# Patient Record
Sex: MALE | Race: WHITE | NOT HISPANIC OR LATINO | Employment: FULL TIME | URBAN - METROPOLITAN AREA
[De-identification: names, ages, dates, MRNs, and addresses within clinical notes are randomized per-mention and may not be internally consistent; named-entity substitution may affect disease eponyms.]

---

## 2018-05-21 ENCOUNTER — OFFICE VISIT (OUTPATIENT)
Dept: URGENT CARE | Age: 58
End: 2018-05-21
Payer: COMMERCIAL

## 2018-05-21 VITALS
OXYGEN SATURATION: 99 % | RESPIRATION RATE: 14 BRPM | TEMPERATURE: 98 F | SYSTOLIC BLOOD PRESSURE: 136 MMHG | DIASTOLIC BLOOD PRESSURE: 80 MMHG | HEART RATE: 89 BPM | BODY MASS INDEX: 24.29 KG/M2 | WEIGHT: 145.8 LBS | HEIGHT: 65 IN

## 2018-05-21 DIAGNOSIS — J02.9 SORE THROAT: ICD-10-CM

## 2018-05-21 DIAGNOSIS — J30.1 SEASONAL ALLERGIC RHINITIS DUE TO POLLEN: Primary | ICD-10-CM

## 2018-05-21 PROBLEM — E11.9 TYPE 2 DIABETES MELLITUS (HCC): Status: ACTIVE | Noted: 2018-05-21

## 2018-05-21 LAB — S PYO AG THROAT QL: NEGATIVE

## 2018-05-21 PROCEDURE — 87070 CULTURE OTHR SPECIMN AEROBIC: CPT | Performed by: PHYSICIAN ASSISTANT

## 2018-05-21 PROCEDURE — S9088 SERVICES PROVIDED IN URGENT: HCPCS | Performed by: FAMILY MEDICINE

## 2018-05-21 PROCEDURE — 87430 STREP A AG IA: CPT | Performed by: FAMILY MEDICINE

## 2018-05-21 PROCEDURE — 99213 OFFICE O/P EST LOW 20 MIN: CPT | Performed by: FAMILY MEDICINE

## 2018-05-21 RX ORDER — OMEPRAZOLE 10 MG/1
10 CAPSULE, DELAYED RELEASE ORAL
COMMUNITY

## 2018-05-21 RX ORDER — LISINOPRIL 10 MG/1
10 TABLET ORAL
COMMUNITY

## 2018-05-21 RX ORDER — GLIPIZIDE 10 MG/1
10 TABLET, FILM COATED, EXTENDED RELEASE ORAL
COMMUNITY

## 2018-05-21 NOTE — PATIENT INSTRUCTIONS
Begin zyrtec at bedtime  Flonase or other nasal steroid during the day  Avoid the following: drying your clothes outside, touching your face after being outside, keeping windows open at home  Recheck with your PCP in 5-7 days if no improvement in symptoms or if new symptoms develop  Allergic Rhinitis   WHAT YOU NEED TO KNOW:   Allergic rhinitis, or hay fever, is swelling of the inside of your nose  The swelling is a reaction to allergens in the air  An allergen can be anything that causes an allergic reaction  Allergies to weeds, grass, trees, or mold often cause seasonal allergic rhinitis  Indoor dust mites, cockroaches, pet dander, or mold can also cause allergic rhinitis  DISCHARGE INSTRUCTIONS:   Call 911 for the following:   · You have chest pain or shortness of breath  Return to the emergency department if:   · You have severe pain  · You cough up blood  Contact your healthcare provider if:   · You have a fever  · You have ear or sinus pain, or a headache  · Your symptoms get worse, even after treatment  · You have yellow, green, brown, or bloody mucus coming from your nose  · Your nose is bleeding or you have pain inside your nose  · You have trouble sleeping because of your symptoms  · You have questions or concerns about your condition or care  Medicines:   · Medicines  help decrease your symptoms and clear your stuffy nose  · Take your medicine as directed  Contact your healthcare provider if you think your medicine is not helping or if you have side effects  Tell him of her if you are allergic to any medicine  Keep a list of the medicines, vitamins, and herbs you take  Include the amounts, and when and why you take them  Bring the list or the pill bottles to follow-up visits  Carry your medicine list with you in case of an emergency    How to manage allergic rhinitis:  The best way to manage allergic rhinitis is to avoid allergens that can trigger your symptoms  Any of the following may help decrease your symptoms:  · Rinse your nose and sinuses  with a salt water solution or use a salt water nasal spray  This will help thin the mucus in your nose and rinse away pollen and dirt  It will also help reduce swelling so you can breathe normally  Ask your healthcare provider how often to rinse your nose  · Reduce exposure to dust mites  Wash sheets and towels in hot water every week  Cover your pillows and mattresses with allergen-free covers  Limit the number of stuffed animals and soft toys your child has  Wash your child's toys in hot water regularly  Vacuum weekly and use a vacuum  with an air filter  If possible, get rid of carpets and curtains  These collect dust and dust mites  · Reduce exposure to pollen  Keep windows and doors closed in your house and car  Stay inside when air pollution or the pollen count is high  Run your air conditioner on recycle, and change air filters often  Shower and wash your hair before bed every night to rinse away pollen  · Reduce exposure to pet dander  If possible, do not keep cats, dogs, birds, or other pets  If you do keep pets in your home, keep them out of bedrooms and carpeted rooms  Bathe them often  · Reduce exposure to mold  Do not spend time in basements  Choose artificial plants instead of live plants  Keep your home's humidity at less than 45%  Do not have ponds or standing water in your home or yard  · Do not smoke  Avoid others who smoke  Ask your healthcare provider for information if you currently smoke and need help to quit  Follow up with your healthcare provider as directed: You may need to see an allergist often to control your symptoms  Write down your questions so you remember to ask them during your visits  © 2017 [employer] Information is for End User's use only and may not be sold, redistributed or otherwise used for commercial purposes   All illustrations and images included in CareNotes® are the copyrighted property of [employer]  or Mark Mendoza  The above information is an  only  It is not intended as medical advice for individual conditions or treatments  Talk to your doctor, nurse or pharmacist before following any medical regimen to see if it is safe and effective for you

## 2018-05-21 NOTE — PROGRESS NOTES
[facility]        NAME: Juan M Calero  is a 61 y o  male  : 1960    MRN: [id number]  DATE: May 21, 2018  TIME: 4:11 PM    Assessment and Plan   Seasonal allergic rhinitis due to pollen [J30 1]  1  Seasonal allergic rhinitis due to pollen     2  Sore throat  POCT rapid strepA    Throat culture       Rapid strep neg  Patient Instructions   Begin zyrtec at bedtime  Flonase or other nasal steroid during the day  Avoid the following: drying your clothes outside, touching your face after being outside, keeping windows open at home  Recheck with your PCP in 5-7 days if no improvement in symptoms or if new symptoms develop  Chief Complaint     Chief Complaint   Patient presents with    Sore Throat     x 3 days- sinus pressure         History of Present Illness       Sore throat and runny nose x 2-3 days  Review of Systems   Review of Systems   Constitutional: Positive for fatigue  Negative for chills and fever  HENT: Positive for congestion, postnasal drip, rhinorrhea and sore throat  Negative for sinus pain and sinus pressure  Respiratory: Negative for cough  Neurological: Negative for headaches  All other systems reviewed and are negative          Current Medications       Current Outpatient Prescriptions:     ascorbic acid (VITAMIN C) 1000 MG tablet, Take 1,000 mg by mouth, Disp: , Rfl:     aspirin 81 MG tablet, Take 81 mg by mouth, Disp: , Rfl:     Cholecalciferol 2000 units CAPS, Take 1 capsule by mouth, Disp: , Rfl:     glipiZIDE (GLUCOTROL XL) 10 mg 24 hr tablet, Take 10 mg by mouth, Disp: , Rfl:     lisinopril (ZESTRIL) 10 mg tablet, Take 10 mg by mouth, Disp: , Rfl:     metFORMIN (GLUCOPHAGE) 1000 MG tablet, Take 1,000 mg by mouth, Disp: , Rfl:     omeprazole (PriLOSEC) 10 mg delayed release capsule, Take 10 mg by mouth, Disp: , Rfl:     Current Allergies     Allergies as of 2018    (No Known Allergies)            The following portions of the patient's history were reviewed and updated as appropriate: allergies, current medications, past family history, past medical history, past social history, past surgical history and problem list    Past Medical History:   Diagnosis Date    Diabetes mellitus ([address] )     Hypertension      History reviewed  No pertinent surgical history  Objective   /80   Pulse 89   Temp 98 °F (36 7 °C) (Temporal)   Resp 14   Ht 5' 5" (1 651 m)   Wt 66 1 kg (145 lb 12 8 oz)   SpO2 99%   BMI 24 26 kg/m²        Physical Exam     Physical Exam   Constitutional: He is oriented to person, place, and time  He appears well-developed and well-nourished  HENT:   Right Ear: Tympanic membrane and external ear normal    Left Ear: Tympanic membrane and external ear normal    Mouth/Throat: No oropharyngeal exudate or posterior oropharyngeal edema  Mild erythema posterior pharynx with post nasal drip noted  Neck: Neck supple  Cardiovascular: Normal rate, regular rhythm and normal heart sounds  Pulmonary/Chest: Effort normal and breath sounds normal    Lymphadenopathy:     He has no cervical adenopathy  Neurological: He is alert and oriented to person, place, and time  Psychiatric: He has a normal mood and affect  His behavior is normal    Nursing note and vitals reviewed

## 2018-05-23 LAB — BACTERIA THROAT CULT: NORMAL

## 2019-10-17 ENCOUNTER — EVALUATION (OUTPATIENT)
Dept: PHYSICAL THERAPY | Age: 59
End: 2019-10-17
Payer: OTHER MISCELLANEOUS

## 2019-10-17 DIAGNOSIS — G89.29 CHRONIC BILATERAL LOW BACK PAIN WITHOUT SCIATICA: Primary | ICD-10-CM

## 2019-10-17 DIAGNOSIS — M54.50 CHRONIC BILATERAL LOW BACK PAIN WITHOUT SCIATICA: Primary | ICD-10-CM

## 2019-10-17 PROCEDURE — 97163 PT EVAL HIGH COMPLEX 45 MIN: CPT | Performed by: PHYSICAL THERAPIST

## 2019-10-17 RX ORDER — DIAZEPAM 5 MG/1
5 TABLET ORAL 3 TIMES DAILY
COMMUNITY

## 2019-10-17 RX ORDER — OXYCODONE HYDROCHLORIDE AND ACETAMINOPHEN 5; 325 MG/1; MG/1
1 TABLET ORAL EVERY 4 HOURS PRN
COMMUNITY

## 2019-10-17 RX ORDER — GABAPENTIN 300 MG/1
300 CAPSULE ORAL 3 TIMES DAILY
COMMUNITY

## 2019-10-17 NOTE — LETTER
2019    Dominic Wisdom MD  [facility] [address]    Patient: Zain Morfin  YOB: 1960   Date of Visit: 10/17/2019     Encounter Diagnosis     ICD-10-CM    1  Chronic bilateral low back pain without sciatica M54 5     G89 29        Dear Dr Kimberly hSin: Thank you for your recent referral of Zain Morfin    Please review the attached evaluation summary from Noe's recent visit  Please verify that you agree with the plan of care by signing the attached order  If you have any questions or concerns, please do not hesitate to call  I sincerely appreciate the opportunity to share in the care of one of your patients and hope to have another opportunity to work with you in the near future  Sincerely,    Rowena Kelly, PT      Referring Provider:      I certify that I have read the below Plan of Care and certify the need for these services furnished under this plan of treatment while under my care  Dominic Wisdom MD  [facility] [address]: [phone]          PT Evaluation     Today's date: 10/17/2019  Patient name: Zain Morfin  : 1960  MRN: [id number]  Referring provider: Leola Poon MD  Dx:   Encounter Diagnosis     ICD-10-CM    1  Chronic bilateral low back pain without sciatica M54 5     G89 29                   Assessment  Assessment details: PT IE: 10-  Patient reported he fell on the floor while at work  Patient noted he saw his PCP who referred patient to pain management who provided pt with  on 10-  Patient noted he had 3 epidurals which was not effective in pain reduction  Patient noted he was referred then to Orthopedic surgery  Patient had a lumbar spine fusion, decompression and laminectomy on 2019  Patient noted initially his pain was better but he noted his pain was not fully reduced   Patient noted he still has constant pain  Patient noted he did work 2 5 hours last week with sedentary activities with severe pain aggravation and thus he did resume work  Patient noted he needs a thyroidectomy  Patient noted currently his symptoms are:  Stabbing pain in the lumbar spine region were the fusion was located  Patient noted he gets intermittent buttock numbness  Patient noted he has intermittent bouts of bilateral le weakness  Patient noted his intermittent bilateral feet paresthesias and sharp pain at night  Patient noted his feet and distal bilateral le feel "like they are on fire" which typically occurs at night  Patient noted sleep is deprived due to lumbar spine and bilateral le pain and paresthesias  Patient noted prolonged sitting, prolonged standing, walking, stair climbing are all limited by pain aggravation  Patient noted he has right distal lumbar spine tenderness  Patient noted donning and doffing his shoes and socks are limited by pain and paresthesia aggravation and bilateral le weakness  Patient noted he fell in July of 2019 while slipping off an uneven pavement  Patient noted he will lose his balance periodically and thus he uses a spc for all standing and ambulation based activities  Patient noted he wears the TLSO with community ambulation  Patient noted he can only tolerate 15 -20 min of sitting and then has to standing up and walk  Patient noted supine lying  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 with pt having the ability to get up and walk, no lifting  Patient noted lifting items out of the refrigerator aggravates his lumbar spine region pain  Patient noted reaching, pulling and pushing activities is also pain aggravating  Patient noted he will lie down for pain reduction  Plus, he noted showering is difficult as well  Patient noted driving is limited as well due to sitting and bilateral le weakness    Patient noted he continues to wear TLSO with all community activities  Impairments: abnormal gait, abnormal or restricted ROM, abnormal movement, impaired physical strength and pain with function  Understanding of Dx/Px/POC: good   Prognosis: fair  Prognosis details: Patient is a 60y o  year old male seen for outpatient PT evaluation with pain and mobility deficits due to fall that caused LBP that required lumbar spine fusion, lumbar spine decompression and lumbar spine laminectomy  Patient presents to PT IE with the following problems, concerns, deficits and impairments: lumbar spine pain and bilateral le paresthesias and pain, decreased lumbar spine range of motion, decreased bilateral le strength, + TTP, gait and stair dysfunctions, transfer dysfunctions, balance deficits, fall history, functional limitations and decreased tolerance to activity  Patient would benefit from skilled PT services under the following PT treatment plan to address the above noted deficits: Pool and Land based therapeutic exercises and activities to facilitate lumbar spine rom and bilateral le rom and strength, modalities, manual therapy techniques, postural reeducation and strengthening, DLS and abdominal strengthening activities, gait and stair training, balance and proprioception activities, IASTM techniques, Kinesio taping techniques and a hep  Thank you for the referral      Goals  Short Term goals 4 - 6 weeks  1  Patient will be independent HEP  2   Patient will report a 25 - 50% decrease in pain complaints  3   Increase strength 1/2 grade  4   Increase ROM 5-10 degrees  Long Term goals 10 - 12 weeks  1  Patient will report elimination of pain complaints  2   Patient will return to all work related activities without restriction  3   Patient will return to all recreational activities without restriction  4   ROM WFL  5   Strength 5/5   6   Patient will report showering improved > 25 %  7   Patient will report dressing improved > 25 %    8   Patient will report sleep improved > 25 %  9   Patient will report sitting improved > 25 %  10   Patient will report prolonged standing improved > 25 %  11  Patient will report driving improved > 25 %  12   Patient will report walking improved > 25 %  13   Patient will report stair climbing improved > 25 %  14   Patient will exhibit balance improvements > 10 %  Plan  Patient would benefit from: skilled physical therapy  Planned modality interventions: cryotherapy, TENS, thermotherapy: hydrocollator packs and unattended electrical stimulation  Planned therapy interventions: aquatic therapy, joint mobilization, manual therapy, massage, balance, neuromuscular re-education, balance/weight bearing training, patient education, self care, strengthening, stretching, therapeutic activities, therapeutic exercise, compression, postural training, body mechanics training, flexibility, functional ROM exercises, gait training, graded activity, graded exercise, graded motor and home exercise program  Frequency: 2x week  Duration in weeks: 12  Treatment plan discussed with: patient        Subjective Evaluation    History of Present Illness  Mechanism of injury: Patient's PMHx is remarkable for DM and HTN  Pain  At best pain ratin  At worst pain ratin  Location: low back pain     Patient Goals  Patient goals for therapy: decreased pain, improved balance, increased motion, increased strength, return to work and independence with ADLs/IADLs  Patient goal: Patient's goal:" to get better "  Objective     Tenderness     Additional Tenderness Details  Patient is + moderate to severe TTP at bilateral lumbar spine erector spinae musculature and + for severe muscle guarding as well in bilateral lumbar spine erector spinae musculature      Active Range of Motion     Lumbar   Flexion: 30 degrees  with pain  Extension: 5 degrees  with pain  Left lateral flexion: 8 degrees    with pain  Right lateral flexion: 8 degrees  with pain  Left rotation: 26 degrees  with pain  Right rotation: 25 degrees  with pain    Strength/Myotome Testing     Left Hip   Planes of Motion   Flexion: 4-  Extension: 4-  Abduction: 3+  Adduction: 4-    Right Hip   Planes of Motion   Flexion: 4-  Extension: 4-  Abduction: 3+  Adduction: 4-    Left Knee   Flexion: 4-  Extension: 4-    Right Knee   Flexion: 4  Extension: 4-    Left Ankle/Foot   Dorsiflexion: 4  Plantar flexion: 4+    Right Ankle/Foot   Dorsiflexion: 4  Plantar flexion: 4+    Ambulation     Ambulation: Level Surfaces   Ambulation with assistive device: independent    Additional Level Surfaces Ambulation Details  Patient ambulates with spc with decrease in pace, decrease in bilateral step length  Ambulation: Stairs   Ascend stairs: independent  Pattern: non-reciprocal  Railings: two rails  Descend stairs: independent  Pattern: non-reciprocal  Railings: two rails    Comments   TUG is at 26 68 seconds  Limits of Stability Testing  Skill Level: Easy  Time to Complete Test: 73 seconds  No Bilateral UE support! Overall:         Actual:     45          Goal:     65  Forward:         Actual:     65          Goal:     65   Backward:         Actual:     26          Goal:     30  Left:                      Actual:     63          Goal:     65  Right:                       Actual:     42          Goal:     65  Forward / Left:         Actual:     55          Goal:     65  Forward / Right:         Actual:     52          Goal:     65   Backward / Left:         Actual:     40          Goal:     65   Backward / Right:         Actual:     29          Goal:     65                                    Precautions: Patient's PMHx is remarkable for DM and HTN  Lumbar spine fusion, decompression and laminectomy on 6-19-19        Manual                                                                                   Exercise Diary  10/17            Water walking pre and post             Seated gastroc stretch:B: Seated hamstring stretch:B:             Seated SKTC stretch:B:             Seated DLS abdominal bracing             Seated LAQ:B:             Seated hip flexion:B:             Seated ankle arom:B:             Standing slr x 3:B:             Standing HR and TR:B:             Standing hamstring curls:B:             sls and tandem stance:B:             Step ups:B:             Step downs:B:             Mini squats             Lunges:B:             Side stepping             Tandem ambulation             Side stepping with squats             Pool pony hang             Paddle rows and horizontal adduction                                                        Modalities

## 2019-10-23 ENCOUNTER — OFFICE VISIT (OUTPATIENT)
Dept: PHYSICAL THERAPY | Age: 59
End: 2019-10-23
Payer: OTHER MISCELLANEOUS

## 2019-10-23 DIAGNOSIS — G89.29 CHRONIC BILATERAL LOW BACK PAIN WITHOUT SCIATICA: Primary | ICD-10-CM

## 2019-10-23 DIAGNOSIS — M54.50 CHRONIC BILATERAL LOW BACK PAIN WITHOUT SCIATICA: Primary | ICD-10-CM

## 2019-10-23 PROCEDURE — 97113 AQUATIC THERAPY/EXERCISES: CPT

## 2019-10-23 NOTE — PROGRESS NOTES
Daily Note     Today's date: 10/23/2019  Patient name: Lisandro Melgar  : 1960  MRN: [id number]  Referring provider: Juwan Johnson MD  Dx:   Encounter Diagnosis     ICD-10-CM    1  Chronic bilateral low back pain without sciatica M54 5     G89 29                   Subjective: Pt noted 6/10 pain today "Pain management changed my meds today and told me to try aqua therapy for pain reduction"       Objective: See treatment diary below      Assessment: Pt moves slow, monitor progression and progress as able  Muscle soreness noted after today's session  Plan: Cont with plan of care      Precautions: Patient's PMHx is remarkable for DM and HTN  Lumbar spine fusion, decompression and laminectomy on 19        Manual                                                                                   Exercise Diary  10/17 10/23           Water walking pre and post 5x  5x            Seated gastroc stretch:B:  20sec 5x            Seated hamstring stretch:B:  20sec 5x            Seated SKTC stretch:B:  NT            Seated DLS abdominal bracing  NT            Seated LAQ:B:  20x            Seated hip flexion:B:  20x            Seated ankle arom:B:  NT            Standing slr x 3:B:   20x            Standing HR and TR:B:  20x            Standing hamstring curls:B:  20x            sls and tandem stance:B:  NT            Step ups:B:  NT            Step downs:B:  NT            Mini squats  20x            Lunges:B:  NT            Side stepping  NT            Tandem ambulation  NT            Side stepping with squats  NT            Pool pony hang  10  min            Paddle rows and horizontal adduction  NT                                                       Modalities

## 2019-10-25 ENCOUNTER — OFFICE VISIT (OUTPATIENT)
Dept: PHYSICAL THERAPY | Age: 59
End: 2019-10-25
Payer: OTHER MISCELLANEOUS

## 2019-10-25 DIAGNOSIS — M54.50 CHRONIC BILATERAL LOW BACK PAIN WITHOUT SCIATICA: Primary | ICD-10-CM

## 2019-10-25 DIAGNOSIS — G89.29 CHRONIC BILATERAL LOW BACK PAIN WITHOUT SCIATICA: Primary | ICD-10-CM

## 2019-10-25 PROCEDURE — 97113 AQUATIC THERAPY/EXERCISES: CPT

## 2019-10-25 NOTE — PROGRESS NOTES
Daily Note     Today's date: 10/25/2019  Patient name: Mark Sim  : 1960  MRN: [id number]  Referring provider: Natalia Long MD  Dx:   Encounter Diagnosis     ICD-10-CM    1  Chronic bilateral low back pain without sciatica M54 5     G89 29                   Subjective: Pt noted 4/10 pain today " I felt a bit better after the 1st session but by the evening it came back with a vengeance"       Objective: See treatment diary below      Assessment: poor relief of symptoms today  Plan: Cont with plan of care      Precautions: Patient's PMHx is remarkable for DM and HTN  Lumbar spine fusion, decompression and laminectomy on 19        Manual                                                                                   Exercise Diary  10/17 10/23 10/25          Water walking pre and post 5x  5x  5x          Seated gastroc stretch:B:  20sec 5x  20sec 5x           Seated hamstring stretch:B:  20sec 5x  20sec 5x           Seated SKTC stretch:B:  NT  10x 10sec           Seated DLS abdominal bracing  NT  20x 3sec           Seated LAQ:B:  20x  20x           Seated hip flexion:B:  20x  20x           Seated ankle arom:B:  NT  NT           Standing slr x 3:B:   20x  20x           Standing HR and TR:B:  20x  20x          Standing hamstring curls:B:  20x  20x           sls and tandem stance:B:  NT  NT           Step ups:B:  NT  NT           Step downs:B:  NT  NT           Mini squats  20x  20x           Lunges:B:  NT  NT           Side stepping  NT  NT           Tandem ambulation  NT  NT           Side stepping with squats  NT  5x           Pool pony hang  10  min  10 min           Paddle rows and horizontal adduction  NT  NT                                                      Modalities

## 2019-10-28 ENCOUNTER — OFFICE VISIT (OUTPATIENT)
Dept: PHYSICAL THERAPY | Age: 59
End: 2019-10-28
Payer: OTHER MISCELLANEOUS

## 2019-10-28 DIAGNOSIS — G89.29 CHRONIC BILATERAL LOW BACK PAIN WITHOUT SCIATICA: Primary | ICD-10-CM

## 2019-10-28 DIAGNOSIS — M54.50 CHRONIC BILATERAL LOW BACK PAIN WITHOUT SCIATICA: Primary | ICD-10-CM

## 2019-10-28 PROCEDURE — 97113 AQUATIC THERAPY/EXERCISES: CPT

## 2019-10-28 NOTE — PROGRESS NOTES
Daily Note     Today's date: 10/28/2019  Patient name: Anusha Fay  : 1960  MRN: [id number]  Referring provider: Laurie Mackay MD  Dx:   Encounter Diagnosis     ICD-10-CM    1  Chronic bilateral low back pain without sciatica M54 5     G89 29                   Subjective: pt  Reports pain 7/10 today  Pt  Slow with pool program        Objective: See treatment diary below      Assessment: Tolerated treatment well  Patient would benefit from continued PT      Plan: Continue per plan of care  Precautions: Patient's PMHx is remarkable for DM and HTN  Lumbar spine fusion, decompression and laminectomy on 19        Manual                                                                                   Exercise Diary  10/17 10/23 10/25 10/28         Water walking pre and post 5x  5x  5x 5/5         Seated gastroc stretch:B:  20sec 5x  20sec 5x  20sec x 5         Seated hamstring stretch:B:  20sec 5x  20sec 5x  20sec x 5         Seated SKTC stretch:B:  NT  10x 10sec  20sec x 5         Seated DLS abdominal bracing  NT  20x 3sec  20x         Seated LAQ:B:  20x  20x  20x         Seated hip flexion:B:  20x  20x  20x         Seated ankle arom:B:  NT  NT           Standing slr x 3:B:   20x  20x  20x         Standing HR and TR:B:  20x  20x 20x         Standing hamstring curls:B:  20x  20x  20x         sls and tandem stance:B:  NT  NT           Step ups:B:  NT  NT           Step downs:B:  NT  NT           Mini squats  20x  20x           Lunges:B:  NT  NT           Side stepping  NT  NT           Tandem ambulation  NT  NT           Side stepping with squats  NT  5x  5x         Pool pony hang  10  min  10 min  5 min         Paddle rows and horizontal adduction  NT  NT                                                      Modalities

## 2019-11-01 ENCOUNTER — OFFICE VISIT (OUTPATIENT)
Dept: PHYSICAL THERAPY | Age: 59
End: 2019-11-01
Payer: OTHER MISCELLANEOUS

## 2019-11-01 DIAGNOSIS — G89.29 CHRONIC BILATERAL LOW BACK PAIN WITHOUT SCIATICA: Primary | ICD-10-CM

## 2019-11-01 DIAGNOSIS — M54.50 CHRONIC BILATERAL LOW BACK PAIN WITHOUT SCIATICA: Primary | ICD-10-CM

## 2019-11-01 PROCEDURE — 97113 AQUATIC THERAPY/EXERCISES: CPT

## 2019-11-01 NOTE — PROGRESS NOTES
Daily Note     Today's date: 2019  Patient name: Harshal Jolly  : 1960  MRN: [id number]  Referring provider: Norberto Johnson MD  Dx:   Encounter Diagnosis     ICD-10-CM    1  Chronic bilateral low back pain without sciatica M54 5     G89 29                   Subjective: Pt noted 5/10 LB pain today "I still cant put my socks on"       Objective: See treatment diary below      Assessment: Pt moves slow, Improved relief time after aqau sessions to half a day  Progress as able       Plan: Continue per plan of care  Precautions: Patient's PMHx is remarkable for DM and HTN  Lumbar spine fusion, decompression and laminectomy on 19        Manual                                                                                   Exercise Diary  10/17 10/23 10/25 10/28 11/1        Water walking pre and post 5x  5x  5x 5/5 5/5        Seated gastroc stretch:B:  20sec 5x  20sec 5x  20sec x 5 NT         Seated hamstring stretch:B:  20sec 5x  20sec 5x  20sec x 5 20sec 5x         Seated SKTC stretch:B:  NT  10x 10sec  20sec x 5 10x 10sec         Seated DLS abdominal bracing  NT  20x 3sec  20x 20X 3sec         Seated LAQ:B:  20x  20x  20x 20x        Seated hip flexion:B:  20x  20x  20x 20x         Seated ankle arom:B:  NT  NT   NT         Standing slr x 3:B:   20x  20x  20x 20x        Standing HR and TR:B:  20x  20x 20x 20x        Standing hamstring curls:B:  20x  20x  20x 20x        sls and tandem stance:B:  NT  NT   NT         Step ups:B:  NT  NT   NT         Step downs:B:  NT  NT   NT         Mini squats  20x  20x   20x        Lunges:B:  NT  NT   NT         Side stepping  NT  NT  6x  6x        Tandem ambulation  NT  NT   6x        Side stepping with squats  NT  5x  5x NT         Pool pony hang  10  min  10 min  5 min 5 min         Paddle rows and horizontal adduction  NT  NT   NT                                                    Modalities

## 2019-11-06 ENCOUNTER — OFFICE VISIT (OUTPATIENT)
Dept: PHYSICAL THERAPY | Age: 59
End: 2019-11-06
Payer: OTHER MISCELLANEOUS

## 2019-11-06 DIAGNOSIS — G89.29 CHRONIC BILATERAL LOW BACK PAIN WITHOUT SCIATICA: Primary | ICD-10-CM

## 2019-11-06 DIAGNOSIS — M54.50 CHRONIC BILATERAL LOW BACK PAIN WITHOUT SCIATICA: Primary | ICD-10-CM

## 2019-11-06 PROCEDURE — 97113 AQUATIC THERAPY/EXERCISES: CPT

## 2019-11-08 ENCOUNTER — OFFICE VISIT (OUTPATIENT)
Dept: PHYSICAL THERAPY | Age: 59
End: 2019-11-08
Payer: OTHER MISCELLANEOUS

## 2019-11-08 DIAGNOSIS — G89.29 CHRONIC BILATERAL LOW BACK PAIN WITHOUT SCIATICA: Primary | ICD-10-CM

## 2019-11-08 DIAGNOSIS — M54.50 CHRONIC BILATERAL LOW BACK PAIN WITHOUT SCIATICA: Primary | ICD-10-CM

## 2019-11-08 PROCEDURE — 97113 AQUATIC THERAPY/EXERCISES: CPT

## 2019-11-08 NOTE — PROGRESS NOTES
Daily Note     Today's date: 2019  Patient name: Arabella Bermudez  : 1960  MRN: [id number]  Referring provider: Justen Shen MD  Dx:   Encounter Diagnosis     ICD-10-CM    1  Chronic bilateral low back pain without sciatica M54 5     G89 29                   Subjective: Pt  Reports pain management gave him a TENS unit for pain control  Reports LBP occurs with greater than 25 min and with bending      Objective: See treatment diary below      Assessment: Tolerated treatment well  Just cautioned pt to keep posture and core stabilized and to keep leg height low with SLR  Did not have increased pain initially with tx  Patient would benefit from continued PT      Plan: Continue per plan of care  Precautions: Patient's PMHx is remarkable for DM and HTN  Lumbar spine fusion, decompression and laminectomy on 19        Manual                                                                                   Exercise Diary  10/17 10/23 10/25 10/28 11/1 11/6 11/8      Water walking pre and post 5x  5x  5x 5/5 5/5 5/5 5/5      Seated gastroc stretch:B:  20sec 5x  20sec 5x  20sec x 5 NT         Seated hamstring stretch:B:  20sec 5x  20sec 5x  20sec x 5 20sec 5x  20sec x 5 [id number]      Seated SKTC stretch:B:  NT  10x 10sec  20sec x 5 10x 10sec  10sec x 10 [id number]      Seated DLS abdominal bracing/standing  NT  20x 3sec  20x 20X 3sec  3sec x 20 [id number]      Seated LAQ:B:  20x  20x  20x 20x 20x 20x      Seated hip flexion:B:  20x  20x  20x 20x  20x 20x      Seated ankle arom:B:  NT  NT   NT         Standing slr x 3:B:   20x  20x  20x 20x 30x 30x      Standing HR and TR:B:  20x  20x 20x 20x 30x 30x      Standing hamstring curls:B:  20x  20x  20x 20x 30x 30x      sls and tandem stance:B:  NT  NT   NT         Step ups:B:  NT  NT   NT         Step downs:B:  NT  NT   NT         Mini squats  20x  20x   20x 30x 30x      Lunges:B:  NT  NT   NT         Side stepping  NT  NT  6x  6x 6x 6x      Tandem ambulation  NT  NT   6x 6x 6x      Side stepping with squats  NT  5x  5x NT         Pool pony hang  10  min  10 min  5 min 5 min  5 min 5min      Paddle rows and horizontal adduction  NT  NT   NT                                                    Modalities

## 2019-11-13 ENCOUNTER — APPOINTMENT (OUTPATIENT)
Dept: PHYSICAL THERAPY | Age: 59
End: 2019-11-13
Payer: OTHER MISCELLANEOUS

## 2019-11-15 ENCOUNTER — EVALUATION (OUTPATIENT)
Dept: PHYSICAL THERAPY | Age: 59
End: 2019-11-15
Payer: OTHER MISCELLANEOUS

## 2019-11-15 ENCOUNTER — OFFICE VISIT (OUTPATIENT)
Dept: PHYSICAL THERAPY | Age: 59
End: 2019-11-15
Payer: OTHER MISCELLANEOUS

## 2019-11-15 DIAGNOSIS — M54.50 CHRONIC BILATERAL LOW BACK PAIN WITHOUT SCIATICA: Primary | ICD-10-CM

## 2019-11-15 DIAGNOSIS — G89.29 CHRONIC BILATERAL LOW BACK PAIN WITHOUT SCIATICA: Primary | ICD-10-CM

## 2019-11-15 PROCEDURE — 97113 AQUATIC THERAPY/EXERCISES: CPT

## 2019-11-15 NOTE — PROGRESS NOTES
PT Evaluation / PT Reassessment    Today's date: 11/15/2019  Patient name: Dedra Lopez  : 1960  MRN: [id number]  Referring provider: Ernestine Hall MD  Dx:   Encounter Diagnosis     ICD-10-CM    1  Chronic bilateral low back pain without sciatica M54 5     G89 29                   Assessment  Assessment details: PT Reassessment: 11-:  Patient reported the following progress since onset of PT: walking improved, increase in lumbar spine mobility, decrease in lumbar spine pain  But, he  Noted the following deficits that still persist: prolonged walking is limited by pain aggravation in lumbar spine, am iadls, dressing activities, showering, unable to wear socks due to unable to jessica and doff socks, drying his body off  Patient reported he had an EMG yesterday which he noted he was told that he has nerve damage in left > right  PT IE: 10-  Patient reported he fell on the floor while at work  Patient noted he saw his PCP who referred patient to pain management who provided pt with  on 10-  Patient noted he had 3 epidurals which was not effective in pain reduction  Patient noted he was referred then to Orthopedic surgery  Patient had a lumbar spine fusion, decompression and laminectomy on 2019  Patient noted initially his pain was better but he noted his pain was not fully reduced  Patient noted he still has constant pain  Patient noted he did work 2 5 hours last week with sedentary activities with severe pain aggravation and thus he did resume work  Patient noted he needs a thyroidectomy  Patient noted currently his symptoms are:  Stabbing pain in the lumbar spine region were the fusion was located  Patient noted he gets intermittent buttock numbness  Patient noted he has intermittent bouts of bilateral le weakness  Patient noted his intermittent bilateral feet paresthesias and sharp pain at night    Patient noted his feet and distal bilateral le feel "like they are on fire" which typically occurs at night  Patient noted sleep is deprived due to lumbar spine and bilateral le pain and paresthesias  Patient noted prolonged sitting, prolonged standing, walking, stair climbing are all limited by pain aggravation  Patient noted he has right distal lumbar spine tenderness  Patient noted donning and doffing his shoes and socks are limited by pain and paresthesia aggravation and bilateral le weakness  Patient noted he fell in July of 2019 while slipping off an uneven pavement  Patient noted he will lose his balance periodically and thus he uses a spc for all standing and ambulation based activities  Patient noted he wears the TLSO with community ambulation  Patient noted he can only tolerate 15 -20 min of sitting and then has to standing up and walk  Patient noted supine lying  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 with pt having the ability to get up and walk, no lifting  Patient noted lifting items out of the refrigerator aggravates his lumbar spine region pain  Patient noted reaching, pulling and pushing activities is also pain aggravating  Patient noted he will lie down for pain reduction  Plus, he noted showering is difficult as well  Patient noted driving is limited as well due to sitting and bilateral le weakness  Patient noted he continues to wear TLSO with all community activities  Impairments: abnormal gait, abnormal or restricted ROM, abnormal movement, impaired physical strength and pain with function  Understanding of Dx/Px/POC: good   Prognosis: fair  Prognosis details: Patient is a 60y o  year old male seen for outpatient PT reevaluation with pain and mobility deficits due to fall that caused LBP that required lumbar spine fusion, lumbar spine decompression and lumbar spine laminectomy   Patient presents with the following progress since onset of PT: decrease in lumbar spine pain, increase in lumbar spine rom, increase in bilateral le strength, gait improvements, balance progress and functional progress  But, he presents to PT IE with the following problems, concerns, deficits and impairments that continue to persist: lumbar spine pain and bilateral le paresthesias and pain, decreased lumbar spine range of motion, decreased bilateral le strength, + TTP, gait and stair dysfunctions, transfer dysfunctions, balance deficits, fall history, functional limitations and decreased tolerance to activity  Patient would benefit from skilled PT services under the following PT treatment plan to address the above noted deficits: Pool and Land based therapeutic exercises and activities to facilitate lumbar spine rom and bilateral le rom and strength, modalities, manual therapy techniques, postural reeducation and strengthening, DLS and abdominal strengthening activities, gait and stair training, balance and proprioception activities, IASTM techniques, Kinesio taping techniques and a hep  Thank you for the referral      Goals  Short Term goals 4 - 6 weeks  1  Patient will be independent HEP  MET  2   Patient will report a 25 - 50% decrease in pain complaints  Partially MET  3   Increase strength 1/2 grade  Partially MET  4   Increase ROM 5-10 degrees  MET  Long Term goals 10 - 12 weeks  1  Patient will report elimination of pain complaints  Partially MET  2   Patient will return to all work related activities without restriction  Partially MET  3   Patient will return to all recreational activities without restriction  Partially MET  4   ROM WFL  Partially MET  5   Strength 5/5  Partially MET  6   Patient will report showering improved > 25 %  Partially MET  7   Patient will report dressing improved > 25 %  Partially MET  8   Patient will report sleep improved > 25 %  Partially MET  9   Patient will report sitting improved > 25 %  Partially MET    10   Patient will report prolonged standing improved > 25 %Partially MET   11   Patient will report driving improved > 25 %  Partially MET  12   Patient will report walking improved > 25 %  Partially MET  13   Patient will report stair climbing improved > 25 %  Partially MET  14   Patient will exhibit balance improvements > 10 %  MET  Plan  Patient would benefit from: skilled physical therapy  Planned modality interventions: cryotherapy, TENS, thermotherapy: hydrocollator packs and unattended electrical stimulation  Planned therapy interventions: aquatic therapy, joint mobilization, manual therapy, massage, balance, neuromuscular re-education, balance/weight bearing training, patient education, self care, strengthening, stretching, therapeutic activities, therapeutic exercise, compression, postural training, body mechanics training, flexibility, functional ROM exercises, gait training, graded activity, graded exercise, graded motor and home exercise program  Frequency: 2x week  Duration in weeks: 12  Treatment plan discussed with: patient        Subjective Evaluation    History of Present Illness  Mechanism of injury: Patient's PMHx is remarkable for DM and HTN  Pain  At best pain ratin  At worst pain ratin  Location: low back pain     Patient Goals  Patient goals for therapy: decreased pain, improved balance, increased motion, increased strength, return to work and independence with ADLs/IADLs  Patient goal: Patient's goal:" to get better "  Objective     Tenderness     Additional Tenderness Details  Patient is + moderate to severe TTP at bilateral lumbar spine erector spinae musculature and + for severe muscle guarding as well in bilateral lumbar spine erector spinae musculature      Active Range of Motion     Lumbar   Flexion: 44 degrees  with pain  Extension: 10 degrees  with pain  Left lateral flexion: 12 degrees    with pain  Right lateral flexion: 12 degrees  with pain  Left rotation: 34 degrees  with pain  Right rotation: 35 degrees  with pain    Strength/Myotome Testing     Left Hip   Planes of Motion   Flexion: 4-  Extension: 4-  Abduction: 4-  Adduction: 4    Right Hip   Planes of Motion   Flexion: 4-  Extension: 4-  Abduction: 4-  Adduction: 4    Left Knee   Flexion: 4  Extension: 4-    Right Knee   Flexion: 4  Extension: 4-    Left Ankle/Foot   Dorsiflexion: 4  Plantar flexion: 4+    Right Ankle/Foot   Dorsiflexion: 4  Plantar flexion: 4+    Ambulation     Ambulation: Level Surfaces   Ambulation with assistive device: independent    Additional Level Surfaces Ambulation Details  Patient ambulates with spc with decrease in bilateral step length  Ambulation: Stairs   Ascend stairs: independent  Pattern: non-reciprocal  Railings: two rails  Descend stairs: independent  Pattern: non-reciprocal  Railings: two rails    Comments   TUG is at was at 26 68 on PT IE and now at 20 73 seconds  Limits of Stability Testing  Skill Level: Easy  Time to Complete Test: 73 seconds at onset of PT IE and now at 40 seconds  No Bilateral UE support! PT IE Results:  Overall:         Actual:     45          Goal:     65  Forward:         Actual:     65          Goal:     65   Backward:         Actual:     26          Goal:     30  Left:                      Actual:     63          Goal:     65  Right:                       Actual:     42          Goal:     65  Forward / Left:         Actual:     55          Goal:     65  Forward / Right:         Actual:     52          Goal:     65   Backward / Left:         Actual:     40          Goal:     65   Backward / Right:         Actual:     29          Goal:     65     PT Reassessment Results:  Overall:         Actual:     70          Goal:     65  Forward:         Actual:     81          Goal:     65   Backward:         Actual:     43          Goal:     30  Left:                      Actual:     91          Goal:     65  Right:                       Actual:     79          Goal:     65    Forward / Left: Actual:     72          Goal:     65  Forward / Right:         Actual:     76          Goal:     65   Backward / Left:         Actual:     57          Goal:     65   Backward / Right:         Actual:     78          Goal:     65          Precautions: Patient's PMHx is remarkable for DM and HTN  Lumbar spine fusion, decompression and laminectomy on 6-19-19

## 2019-11-15 NOTE — LETTER
2019    Aida Peterson MD  [facility] [address]    Patient: Flo Miles  YOB: 1960   Date of Visit: 11/15/2019     Encounter Diagnosis     ICD-10-CM    1  Chronic bilateral low back pain without sciatica M54 5     G89 29        Dear Dr Brenda Roberts: Thank you for your recent referral of Flo Miles    Please review the attached evaluation summary from Noe's recent visit  Please verify that you agree with the plan of care by signing the attached order  If you have any questions or concerns, please do not hesitate to call  I sincerely appreciate the opportunity to share in the care of one of your patients and hope to have another opportunity to work with you in the near future  Sincerely,    Dex Kelly, PT      Referring Provider:      I certify that I have read the below Plan of Care and certify the need for these services furnished under this plan of treatment while under my care  Aida Peterson MD  [facility] [address]: [phone]          PT Evaluation / PT Reassessment    Today's date: 11/15/2019  Patient name: Flo Miles  : 1960  MRN: [id number]  Referring provider: Harsh Mauricio MD  Dx:   Encounter Diagnosis     ICD-10-CM    1  Chronic bilateral low back pain without sciatica M54 5     G89 29                   Assessment  Assessment details: PT Reassessment: 11-:  Patient reported the following progress since onset of PT: walking improved, increase in lumbar spine mobility, decrease in lumbar spine pain  But, he  Noted the following deficits that still persist: prolonged walking is limited by pain aggravation in lumbar spine, am iadls, dressing activities, showering, unable to wear socks due to unable to jessica and doff socks, drying his body off    Patient reported he had an EMG yesterday which he noted he was told that he has nerve damage in left > right  PT IE: 10-  Patient reported he fell on the floor while at work  Patient noted he saw his PCP who referred patient to pain management who provided pt with  on 10-  Patient noted he had 3 epidurals which was not effective in pain reduction  Patient noted he was referred then to Orthopedic surgery  Patient had a lumbar spine fusion, decompression and laminectomy on 6-  Patient noted initially his pain was better but he noted his pain was not fully reduced  Patient noted he still has constant pain  Patient noted he did work 2 5 hours last week with sedentary activities with severe pain aggravation and thus he did resume work  Patient noted he needs a thyroidectomy  Patient noted currently his symptoms are:  Stabbing pain in the lumbar spine region were the fusion was located  Patient noted he gets intermittent buttock numbness  Patient noted he has intermittent bouts of bilateral le weakness  Patient noted his intermittent bilateral feet paresthesias and sharp pain at night  Patient noted his feet and distal bilateral le feel "like they are on fire" which typically occurs at night  Patient noted sleep is deprived due to lumbar spine and bilateral le pain and paresthesias  Patient noted prolonged sitting, prolonged standing, walking, stair climbing are all limited by pain aggravation  Patient noted he has right distal lumbar spine tenderness  Patient noted donning and doffing his shoes and socks are limited by pain and paresthesia aggravation and bilateral le weakness  Patient noted he fell in July of 2019 while slipping off an uneven pavement  Patient noted he will lose his balance periodically and thus he uses a spc for all standing and ambulation based activities  Patient noted he wears the TLSO with community ambulation  Patient noted he can only tolerate 15 -20 min of sitting and then has to standing up and walk    Patient noted supine lying  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 with pt having the ability to get up and walk, no lifting  Patient noted lifting items out of the refrigerator aggravates his lumbar spine region pain  Patient noted reaching, pulling and pushing activities is also pain aggravating  Patient noted he will lie down for pain reduction  Plus, he noted showering is difficult as well  Patient noted driving is limited as well due to sitting and bilateral le weakness  Patient noted he continues to wear TLSO with all community activities  Impairments: abnormal gait, abnormal or restricted ROM, abnormal movement, impaired physical strength and pain with function  Understanding of Dx/Px/POC: good   Prognosis: fair  Prognosis details: Patient is a 60y o  year old male seen for outpatient PT reevaluation with pain and mobility deficits due to fall that caused LBP that required lumbar spine fusion, lumbar spine decompression and lumbar spine laminectomy  Patient presents with the following progress since onset of PT: decrease in lumbar spine pain, increase in lumbar spine rom, increase in bilateral le strength, gait improvements, balance progress and functional progress  But, he presents to PT IE with the following problems, concerns, deficits and impairments that continue to persist: lumbar spine pain and bilateral le paresthesias and pain, decreased lumbar spine range of motion, decreased bilateral le strength, + TTP, gait and stair dysfunctions, transfer dysfunctions, balance deficits, fall history, functional limitations and decreased tolerance to activity    Patient would benefit from skilled PT services under the following PT treatment plan to address the above noted deficits: Pool and Land based therapeutic exercises and activities to facilitate lumbar spine rom and bilateral le rom and strength, modalities, manual therapy techniques, postural reeducation and strengthening, DLS and abdominal strengthening activities, gait and stair training, balance and proprioception activities, IASTM techniques, Kinesio taping techniques and a hep  Thank you for the referral      Goals  Short Term goals 4 - 6 weeks  1  Patient will be independent HEP  MET  2   Patient will report a 25 - 50% decrease in pain complaints  Partially MET  3   Increase strength 1/2 grade  Partially MET  4   Increase ROM 5-10 degrees  MET  Long Term goals 10 - 12 weeks  1  Patient will report elimination of pain complaints  Partially MET  2   Patient will return to all work related activities without restriction  Partially MET  3   Patient will return to all recreational activities without restriction  Partially MET  4   ROM WFL  Partially MET  5   Strength 5/5  Partially MET  6   Patient will report showering improved > 25 %  Partially MET  7   Patient will report dressing improved > 25 %  Partially MET  8   Patient will report sleep improved > 25 %  Partially MET  9   Patient will report sitting improved > 25 %  Partially MET  10   Patient will report prolonged standing improved > 25 %Partially MET  11   Patient will report driving improved > 25 %  Partially MET  12   Patient will report walking improved > 25 %  Partially MET  13   Patient will report stair climbing improved > 25 %  Partially MET  14   Patient will exhibit balance improvements > 10 %  MET      Plan  Patient would benefit from: skilled physical therapy  Planned modality interventions: cryotherapy, TENS, thermotherapy: hydrocollator packs and unattended electrical stimulation  Planned therapy interventions: aquatic therapy, joint mobilization, manual therapy, massage, balance, neuromuscular re-education, balance/weight bearing training, patient education, self care, strengthening, stretching, therapeutic activities, therapeutic exercise, compression, postural training, body mechanics training, flexibility, functional ROM exercises, gait training, graded activity, graded exercise, graded motor and home exercise program  Frequency: 2x week  Duration in weeks: 12  Treatment plan discussed with: patient        Subjective Evaluation    History of Present Illness  Mechanism of injury: Patient's PMHx is remarkable for DM and HTN  Pain  At best pain ratin  At worst pain ratin  Location: low back pain     Patient Goals  Patient goals for therapy: decreased pain, improved balance, increased motion, increased strength, return to work and independence with ADLs/IADLs  Patient goal: Patient's goal:" to get better "  Objective     Tenderness     Additional Tenderness Details  Patient is + moderate to severe TTP at bilateral lumbar spine erector spinae musculature and + for severe muscle guarding as well in bilateral lumbar spine erector spinae musculature  Active Range of Motion     Lumbar   Flexion: 44 degrees  with pain  Extension: 10 degrees  with pain  Left lateral flexion: 12 degrees    with pain  Right lateral flexion: 12 degrees  with pain  Left rotation: 34 degrees  with pain  Right rotation: 35 degrees  with pain    Strength/Myotome Testing     Left Hip   Planes of Motion   Flexion: 4-  Extension: 4-  Abduction: 4-  Adduction: 4    Right Hip   Planes of Motion   Flexion: 4-  Extension: 4-  Abduction: 4-  Adduction: 4    Left Knee   Flexion: 4  Extension: 4-    Right Knee   Flexion: 4  Extension: 4-    Left Ankle/Foot   Dorsiflexion: 4  Plantar flexion: 4+    Right Ankle/Foot   Dorsiflexion: 4  Plantar flexion: 4+    Ambulation     Ambulation: Level Surfaces   Ambulation with assistive device: independent    Additional Level Surfaces Ambulation Details  Patient ambulates with spc with decrease in bilateral step length      Ambulation: Stairs   Ascend stairs: independent  Pattern: non-reciprocal  Railings: two rails  Descend stairs: independent  Pattern: non-reciprocal  Railings: two rails    Comments   TUG is at was at 26 68 on PT IE and now at 20 73 seconds  Limits of Stability Testing  Skill Level: Easy  Time to Complete Test: 73 seconds at onset of PT IE and now at 40 seconds  No Bilateral UE support! PT IE Results:  Overall:         Actual:     45          Goal:     65  Forward:         Actual:     65          Goal:     65   Backward:         Actual:     26          Goal:     30  Left:                      Actual:     63          Goal:     65  Right:                       Actual:     42          Goal:     65  Forward / Left:         Actual:     55          Goal:     65  Forward / Right:         Actual:     52          Goal:     65   Backward / Left:         Actual:     40          Goal:     65   Backward / Right:         Actual:     29          Goal:     65     PT Reassessment Results:  Overall:         Actual:     70          Goal:     65  Forward:         Actual:     81          Goal:     65   Backward:         Actual:     43          Goal:     30  Left:                      Actual:     91          Goal:     65  Right:                       Actual:     79          Goal:     65  Forward / Left:         Actual:     72          Goal:     65  Forward / Right:         Actual:     76          Goal:     65   Backward / Left:         Actual:     57          Goal:     65   Backward / Right:         Actual:     78          Goal:     65          Precautions: Patient's PMHx is remarkable for DM and HTN  Lumbar spine fusion, decompression and laminectomy on 6-19-19

## 2019-11-15 NOTE — PROGRESS NOTES
Daily Note     Today's date: 11/15/2019  Patient name: Raghav Montez  : 1960  MRN: [id number]  Referring provider: Ashely Stanley MD  Dx:   Encounter Diagnosis     ICD-10-CM    1  Chronic bilateral low back pain without sciatica M54 5     G89 29                   Subjective: Pt noted that LB       Objective: See treatment diary below      Assessment: Pt challenged with prolonged standing and forward bending  Plan: Continue per plan of care  Precautions: Patient's PMHx is remarkable for DM and HTN  Lumbar spine fusion, decompression and laminectomy on 19        Manual                                                                                   Exercise Diary  10/17 10/23 10/25 10/28 11/1 11/6 11/8 11/15     Water walking pre and post 5x  5x  5x 5/5 5/5 5/5 5/5 5/5      Seated gastroc stretch:B:  20sec 5x  20sec 5x  20sec x 5 NT         Seated hamstring stretch:B:  20sec 5x  20sec 5x  20sec x 5 20sec 5x  20sec x 5 [id number] 20sec 5x      Seated SKTC stretch:B:  NT  10x 10sec  20sec x 5 10x 10sec  10sec x 10 [id number] 10x 10sec      Seated DLS abdominal bracing/standing  NT  20x 3sec  20x 20X 3sec  3sec x 20 [id number] 3sec 20x      Seated LAQ:B:  20x  20x  20x 20x 20x 20x 20x      Seated hip flexion:B:  20x  20x  20x 20x  20x 20x 20x      Seated ankle arom:B:  NT  NT   NT         Standing slr x 3:B:   20x  20x  20x 20x 30x 30x 30x      Standing HR and TR:B:  20x  20x 20x 20x 30x 30x 30x      Standing hamstring curls:B:  20x  20x  20x 20x 30x 30x 30x      sls and tandem stance:B:  NT  NT   NT    NT      Step ups:B:  NT  NT   NT    20x      Step downs:B:  NT  NT   NT    20x      Mini squats  20x  20x   20x 30x 30x 30x      Lunges:B:  NT  NT   NT         Side stepping  NT  NT  6x  6x 6x 6x 6x      Tandem ambulation  NT  NT   6x 6x 6x 6x      Side stepping with squats  NT  5x  5x NT    NT      Pool pony hang  10  min  10 min  5 min 5 min  5 min 5min 5 min      Paddle rows and horizontal adduction f NT  NT   NT    NT                                                 Modalities

## 2019-11-20 ENCOUNTER — TRANSCRIBE ORDERS (OUTPATIENT)
Dept: PHYSICAL THERAPY | Age: 59
End: 2019-11-20

## 2019-11-20 ENCOUNTER — OFFICE VISIT (OUTPATIENT)
Dept: PHYSICAL THERAPY | Age: 59
End: 2019-11-20
Payer: OTHER MISCELLANEOUS

## 2019-11-20 DIAGNOSIS — M54.50 ACUTE LOW BACK PAIN, UNSPECIFIED BACK PAIN LATERALITY, UNSPECIFIED WHETHER SCIATICA PRESENT: Primary | ICD-10-CM

## 2019-11-20 DIAGNOSIS — M54.50 CHRONIC BILATERAL LOW BACK PAIN WITHOUT SCIATICA: Primary | ICD-10-CM

## 2019-11-20 DIAGNOSIS — G89.29 CHRONIC BILATERAL LOW BACK PAIN WITHOUT SCIATICA: Primary | ICD-10-CM

## 2019-11-20 PROCEDURE — 97113 AQUATIC THERAPY/EXERCISES: CPT

## 2019-11-20 NOTE — PROGRESS NOTES
Daily Note     Today's date: 2019  Patient name: Edwin Grissom  : 1960  MRN: [id number]  Referring provider: Warren Stephens MD  Dx:   Encounter Diagnosis     ICD-10-CM    1  Chronic bilateral low back pain without sciatica M54 5     G89 29                   Subjective: Pt noted increased LB pain since increased activity at home with daily chores "I have a pain that its like a pinch in my back"       Objective: See treatment diary below      Assessment: Poor relief of symptoms after aqua based exercises  Monitor symptoms and progress as able       Plan: Continue per plan of care  Precautions: Patient's PMHx is remarkable for DM and HTN  Lumbar spine fusion, decompression and laminectomy on 19        Manual                                                                                   Exercise Diary  10/17 10/23 10/25 10/28 11/1 11/6 11/8 11/15 11/20    Water walking pre and post 5x  5x  5x 5/5 5/5 5/5 5/5 5/5  5/5    Seated gastroc stretch:B:  20sec 5x  20sec 5x  20sec x 5 NT     NT     Seated hamstring stretch:B:  20sec 5x  20sec 5x  20sec x 5 20sec 5x  20sec x 5 [id number] 20sec 5x  20sec 5x     Seated SKTC stretch:B:  NT  10x 10sec  20sec x 5 10x 10sec  10sec x 10 [id number] 10x 10sec  10x 10sec     Seated DLS abdominal bracing/standing  NT  20x 3sec  20x 20X 3sec  3sec x 20 [id number] 3sec 20x  20x 3sec     Seated LAQ:B:  20x  20x  20x 20x 20x 20x 20x  20x     Seated hip flexion:B:  20x  20x  20x 20x  20x 20x 20x  20x    Seated ankle arom:B:  NT  NT   NT         Standing slr x 3:B:   20x  20x  20x 20x 30x 30x 30x  30x     Standing HR and TR:B:  20x  20x 20x 20x 30x 30x 30x  30x    Standing hamstring curls:B:  20x  20x  20x 20x 30x 30x 30x  30x     sls and tandem stance:B:  NT  NT   NT    NT  NT     Step ups:B:  NT  NT   NT    20x  20x     Step downs:B:  NT  NT   NT    20x  20x     Mini squats  20x  20x   20x 30x 30x 30x  NT     Lunges:B:  NT  NT   NT         Side stepping  NT  NT 6x  6x 6x 6x 6x  8x     Tandem ambulation  NT  NT   6x 6x 6x 6x  NT     Side stepping with squats  NT  5x  5x NT    NT  NT     Pool pony hang  10  min  10 min  5 min 5 min  5 min 5min 5 min  10 min     Paddle rows and horizontal adduction f NT  NT   NT    NT  NT                                               Modalities

## 2019-11-22 ENCOUNTER — OFFICE VISIT (OUTPATIENT)
Dept: PHYSICAL THERAPY | Age: 59
End: 2019-11-22
Payer: OTHER MISCELLANEOUS

## 2019-11-22 DIAGNOSIS — M54.16 LUMBAR RADICULOPATHY: Primary | ICD-10-CM

## 2019-11-22 PROCEDURE — 97113 AQUATIC THERAPY/EXERCISES: CPT

## 2019-11-22 NOTE — PROGRESS NOTES
Daily Note     Today's date: 2019  Patient name: Ryan Prescott  : 1960  MRN: [id number]  Referring provider: Shirlene Quintana MD  Dx:   Encounter Diagnosis     ICD-10-CM    1  Lumbar radiculopathy M54 16               Subjective: CC reports of LBP  5/10 Pain with radicular sx's  Objective: See treatment diary below      Assessment: Tolerated treatment well without elevated sx's -lumbar  Decline any signs of radicular sx's  Noted fatigue exercises and core stabilization  Continue to show signs of lumbar discomfort, but less intense  Plan: Continue POC     Precautions: Patient's PMHx is remarkable for DM and HTN  Lumbar spine fusion, decompression and laminectomy on 19        Manual                                                                                   Exercise Diary  10/17 10/23 10/25 10/28 11/1 11/6 11/8 11/15 11/20 11/22   Water walking pre and post 5x  5x  5x 5/5 5/5 5/5 5/5 5/5  5/5 5/5   Seated gastroc stretch:B:  20sec 5x  20sec 5x  20sec x 5 NT     NT     Seated hamstring stretch:B:  20sec 5x  20sec 5x  20sec x 5 20sec 5x  20sec x 5 [id number] 20sec 5x  20sec 5x  20 sec 5x   Seated SKTC stretch:B:  NT  10x 10sec  20sec x 5 10x 10sec  10sec x 10 [id number] 10x 10sec  10x 10sec  10x 10 sec   Seated DLS abdominal bracing/standing  NT  20x 3sec  20x 20X 3sec  3sec x 20 [id number] 3sec 20x  20x 3sec  20x 3 sec   Seated LAQ:B:  20x  20x  20x 20x 20x 20x 20x  20x  20   Seated hip flexion:B:  20x  20x  20x 20x  20x 20x 20x  20x 20   Seated ankle arom:B:  NT  NT   NT         Standing slr x 3:B:   20x  20x  20x 20x 30x 30x 30x  30x  30x   Standing HR and TR:B:  20x  20x 20x 20x 30x 30x 30x  30x 30x   Standing hamstring curls:B:  20x  20x  20x 20x 30x 30x 30x  30x  30x   sls and tandem stance:B:  NT  NT   NT    NT  NT     Step ups:B:  NT  NT   NT    20x  20x     Step downs:B:  NT  NT   NT    20x  20x     Mini squats  20x  20x   20x 30x 30x 30x  NT     Lunges:B:  NT  NT   NT Side stepping  NT  NT  6x  6x 6x 6x 6x  8x  8x   Tandem ambulation  NT  NT   6x 6x 6x 6x  NT     Side stepping with squats  NT  5x  5x NT    NT  NT     Pool pony hang  10  min  10 min  5 min 5 min  5 min 5min 5 min  10 min     Paddle rows and horizontal adduction f NT  NT   NT    NT  NT                                               Modalities

## 2019-11-27 ENCOUNTER — OFFICE VISIT (OUTPATIENT)
Dept: PHYSICAL THERAPY | Age: 59
End: 2019-11-27
Payer: OTHER MISCELLANEOUS

## 2019-11-27 DIAGNOSIS — M54.50 CHRONIC BILATERAL LOW BACK PAIN WITHOUT SCIATICA: ICD-10-CM

## 2019-11-27 DIAGNOSIS — M54.16 LUMBAR RADICULOPATHY: Primary | ICD-10-CM

## 2019-11-27 DIAGNOSIS — G89.29 CHRONIC BILATERAL LOW BACK PAIN WITHOUT SCIATICA: ICD-10-CM

## 2019-11-27 PROCEDURE — 97113 AQUATIC THERAPY/EXERCISES: CPT | Performed by: SPECIALIST/TECHNOLOGIST

## 2019-11-27 NOTE — PROGRESS NOTES
Daily Note     Today's date: 2019  Patient name: Harshal Jolly  : 1960  MRN: [id number]  Referring provider: Norberto Johnson MD  Dx:   Encounter Diagnosis     ICD-10-CM    1  Lumbar radiculopathy M54 16    2  Chronic bilateral low back pain without sciatica M54 5     G89 29                   Subjective: Pt reports 4/10 LBP and LE fatigue this visit  Objective: See treatment diary below    Assessment: Pt describes decreased low back pressure while in pool this visit  No progressions added to program this visit as pt demonstrates mild-moderate fatigue with current repetitions  Tolerated treatment well  Pt is nearly independent in aquatic exercise program application  Patient demonstrated fatigue post treatment, exhibited good technique with therapeutic exercises and would benefit from continued PT      Plan: Continue per plan of care  Progress treatment as tolerated  Precautions: Patient's PMHx is remarkable for DM and HTN  Lumbar spine fusion, decompression and laminectomy on 19        Manual                                                                                   Exercise Diary  11/15 11/20 11/22 11/27     Water walking pre and post      Seated gastroc stretch:B:  NT        Seated hamstring stretch:B: 20sec 5x  20sec 5x  20 sec 5x 20 sec 5x     Seated SKTC stretch:B: 10x 10sec  10x 10sec  10x 10 sec 10x 10 sec     Seated DLS abdominal bracing/standing 3sec 20x  20x 3sec  20x 3 sec 20x 3 sec     Seated LAQ:B: 20x  20x  20 20x     Seated hip flexion:B: 20x  20x 20 20x     Seated ankle arom:B:         Standing slr x 3:B: 30x  30x  30x 30x     Standing HR and TR:B: 30x  30x 30x 30x     Standing hamstring curls:B: 30x  30x  30x 30x     sls and tandem stance:B: NT  NT        Step ups:B: 20x  20x   20x     Step downs:B: 20x  20x   20x     Mini squats 30x  NT   30x     Lunges:B:         Side stepping 6x  8x  8x 8x     Tandem ambulation 6x  NT        Side stepping with squats NT  NT        Pool pony hang 5 min  10 min   10 min     Paddle rows and horizontal adduction NT  NT                                      Modalities

## 2019-11-29 ENCOUNTER — OFFICE VISIT (OUTPATIENT)
Dept: PHYSICAL THERAPY | Age: 59
End: 2019-11-29
Payer: OTHER MISCELLANEOUS

## 2019-11-29 DIAGNOSIS — M54.16 LUMBAR RADICULOPATHY: Primary | ICD-10-CM

## 2019-11-29 DIAGNOSIS — M54.50 CHRONIC BILATERAL LOW BACK PAIN WITHOUT SCIATICA: ICD-10-CM

## 2019-11-29 DIAGNOSIS — G89.29 CHRONIC BILATERAL LOW BACK PAIN WITHOUT SCIATICA: ICD-10-CM

## 2019-11-29 PROCEDURE — 97113 AQUATIC THERAPY/EXERCISES: CPT

## 2019-11-29 NOTE — PROGRESS NOTES
Daily Note     Today's date: 2019  Patient name: Arabella Bermudez  : 1960  MRN: [id number]  Referring provider: Justen Shen MD  Dx:   Encounter Diagnosis     ICD-10-CM    1  Lumbar radiculopathy M54 16    2  Chronic bilateral low back pain without sciatica M54 5     G89 29                   Subjective: Pt reported 6/10 pain today since early am      Objective: See treatment diary below    Assessment: Poor pain relief today, monitor symptoms and progress as able       Plan: Continue per plan of care  Progress treatment as tolerated  Precautions: Patient's PMHx is remarkable for DM and HTN  Lumbar spine fusion, decompression and laminectomy on 19        Manual                                                                                   Exercise Diary  11/15 11/20 11/22 11/27 11/29    Water walking pre and post      Seated gastroc stretch:B:  NT        Seated hamstring stretch:B: 20sec 5x  20sec 5x  20 sec 5x 20 sec 5x 20sec 5x     Seated SKTC stretch:B: 10x 10sec  10x 10sec  10x 10 sec 10x 10 sec 10x 10sec    Seated DLS abdominal bracing/standing 3sec 20x  20x 3sec  20x 3 sec 20x 3 sec 20x 3sec     Seated LAQ:B: 20x  20x  20 20x 20x     Seated hip flexion:B: 20x  20x 20 20x 20x     Seated ankle arom:B:         Standing slr x 3:B: 30x  30x  30x 30x 30x     Standing HR and TR:B: 30x  30x 30x 30x 30x     Standing hamstring curls:B: 30x  30x  30x 30x 30x     sls and tandem stance:B: NT  NT        Step ups:B: 20x  20x   20x 20x     Step downs:B: 20x  20x   20x 20x     Mini squats 30x  NT   30x 30x     Lunges:B:         Side stepping 6x  8x  8x 8x 8x     Tandem ambulation 6x  NT        Side stepping with squats NT  NT        Pool pony hang 5 min  10 min   10 min 10 min     Paddle rows and horizontal adduction NT  NT                                      Modalities

## 2019-12-04 ENCOUNTER — OFFICE VISIT (OUTPATIENT)
Dept: PHYSICAL THERAPY | Age: 59
End: 2019-12-04
Payer: OTHER MISCELLANEOUS

## 2019-12-04 DIAGNOSIS — M54.50 CHRONIC BILATERAL LOW BACK PAIN WITHOUT SCIATICA: ICD-10-CM

## 2019-12-04 DIAGNOSIS — M54.16 LUMBAR RADICULOPATHY: Primary | ICD-10-CM

## 2019-12-04 DIAGNOSIS — G89.29 CHRONIC BILATERAL LOW BACK PAIN WITHOUT SCIATICA: ICD-10-CM

## 2019-12-04 PROCEDURE — 97113 AQUATIC THERAPY/EXERCISES: CPT

## 2019-12-04 NOTE — PROGRESS NOTES
Daily Note     Today's date: 2019  Patient name: Parminder Herndon  : 1960  MRN: [id number]  Referring provider: Joshua Ocasio MD  Dx:   Encounter Diagnosis     ICD-10-CM    1  Lumbar radiculopathy M54 16    2  Chronic bilateral low back pain without sciatica M54 5     G89 29                   Subjective: Pt reported 6/10 pain today across LB only  Reports he did a lot of cleaning at home and has pain since that time  Pt went to MD and pt was told to pace himself with painful ex and not push through pain  MD also wanted him to use SPC    Objective: See treatment diary below    Assessment: Pain rated as 5/10 post ex  Pt felt water loosened tight muscles  Plan: Continue per plan of care  Progress treatment as tolerated  Precautions: Patient's PMHx is remarkable for DM and HTN  Lumbar spine fusion, decompression and laminectomy on 19        Manual                                                                                   Exercise Diary  11/15 11/20 11/22 11/27 11/29 12/4   Water walking pre and post   5 5 5 5  5   Seated gastroc stretch:B:  NT        Seated hamstring stretch:B: 20sec 5x  20sec 5x  20 sec 5x 20 sec 5x 20sec 5x  [id number]   Seated SKTC stretch:B: 10x 10sec  10x 10sec  10x 10 sec 10x 10 sec 10x 10sec 10x 10sec   Seated DLS abdominal bracing/standing 3sec 20x  20x 3sec  20x 3 sec 20x 3 sec 20x 3sec  20x 3sec   Seated LAQ:B: 20x  20x  20 20x 20x  20x   Seated hip flexion:B: 20x  20x 20 20x 20x  20x   Seated ankle arom:B:         Standing slr x 3:B: 30x  30x  30x 30x 30x  30x   Standing HR and TR:B: 30x  30x 30x 30x 30x  30x   Standing hamstring curls:B: 30x  30x  30x 30x 30x  30x   sls and tandem stance:B: NT  NT        Step ups:B: 20x  20x   20x 20x  20x   Step downs:B: 20x  20x   20x 20x  20x   Mini squats 30x  NT   30x 30x  30x   Lunges:B:         Side stepping 6x  8x  8x 8x 8x  8x   Tandem ambulation 6x  NT        Side stepping with squats NT NT        Pool pony hang 5 min  10 min   10 min 10 min     Paddle rows and horizontal adduction NT  NT    Rows and shld ext x20                                  Modalities

## 2019-12-06 ENCOUNTER — OFFICE VISIT (OUTPATIENT)
Dept: PHYSICAL THERAPY | Age: 59
End: 2019-12-06
Payer: OTHER MISCELLANEOUS

## 2019-12-06 DIAGNOSIS — M54.50 CHRONIC BILATERAL LOW BACK PAIN WITHOUT SCIATICA: ICD-10-CM

## 2019-12-06 DIAGNOSIS — M54.16 LUMBAR RADICULOPATHY: Primary | ICD-10-CM

## 2019-12-06 DIAGNOSIS — G89.29 CHRONIC BILATERAL LOW BACK PAIN WITHOUT SCIATICA: ICD-10-CM

## 2019-12-06 PROCEDURE — 97113 AQUATIC THERAPY/EXERCISES: CPT

## 2019-12-06 NOTE — PROGRESS NOTES
Daily Note     Today's date: 2019  Patient name: Ryan Prescott  : 1960  MRN: [id number]  Referring provider: Shirlene Quintana MD  Dx:   Encounter Diagnosis     ICD-10-CM    1  Lumbar radiculopathy M54 16    2  Chronic bilateral low back pain without sciatica M54 5     G89 29                   Subjective: Pt reported 5/10 pain today across LB only  Reports he did a lot of cleaning at home and has pain since that time  Pt having bad pain day with lack of sleep  Objective: See treatment diary below    Assessment: Pain rated as 4/10 post ex  Pt felt water loosened tight muscles  Pt glad he attended therapy as it helped his pain  Plan: Continue per plan of care  Progress treatment as tolerated  Precautions: Patient's PMHx is remarkable for DM and HTN  Lumbar spine fusion, decompression and laminectomy on 19        Manual                                                                                   Exercise Diary     Water walking pre and post    Seated gastroc stretch:B:  NT        Seated hamstring stretch:B: 20sec 5x  20sec 5x  20 sec 5x 20 sec 5x 20sec 5x  [id number]   Seated SKTC stretch:B: 10x 10sec  10x 10sec  10x 10 sec 10x 10 sec 10x 10sec 10x 10sec   Seated DLS abdominal bracing/standing 3sec 20x  20x 3sec  20x 3 sec 20x 3 sec 20x 3sec  20x 3sec   Seated LAQ:B: 20x  20x  20 20x 20x  20x   Seated hip flexion:B: 20x  20x 20 20x 20x  20x   Seated ankle arom:B:         Standing slr x 3:B: 30x  30x  30x 30x 30x  30x   Standing HR and TR:B: 30x  30x 30x 30x 30x  30x   Standing hamstring curls:B: 30x  30x  30x 30x 30x  30x   sls and tandem stance:B: NT  NT        Step ups:B: 20x  20x   20x 20x  20x   Step downs:B: 20x  20x   20x 20x  20x   Mini squats 30x  NT   30x 30x  30x   Lunges:B:         Side stepping 8x  8x  8x 8x 8x  8x   Tandem ambulation 6x  NT        Side stepping with squats NT  NT        Pool pony hang 5 min  10 min   10 min 10 min  10min   Paddle rows and horizontal adduction rowsand shld ext x20 NT    Rows and shld ext x20                                  Modalities

## 2019-12-11 ENCOUNTER — OFFICE VISIT (OUTPATIENT)
Dept: PHYSICAL THERAPY | Age: 59
End: 2019-12-11
Payer: OTHER MISCELLANEOUS

## 2019-12-11 DIAGNOSIS — M54.50 CHRONIC BILATERAL LOW BACK PAIN WITHOUT SCIATICA: ICD-10-CM

## 2019-12-11 DIAGNOSIS — G89.29 CHRONIC BILATERAL LOW BACK PAIN WITHOUT SCIATICA: ICD-10-CM

## 2019-12-11 DIAGNOSIS — M54.16 LUMBAR RADICULOPATHY: Primary | ICD-10-CM

## 2019-12-11 PROCEDURE — 97113 AQUATIC THERAPY/EXERCISES: CPT | Performed by: SPECIALIST/TECHNOLOGIST

## 2019-12-11 NOTE — PROGRESS NOTES
Daily Note     Today's date: 2019  Patient name: Edwin Grissom  : 1960  MRN: [id number]  Referring provider: Warren Stephens MD  Dx:   Encounter Diagnosis     ICD-10-CM    1  Lumbar radiculopathy M54 16    2  Chronic bilateral low back pain without sciatica M54 5     G89 29                   Subjective: Pt reports he irritated his back on  and it was flared up for ~72 hours, pt reports he had discomfort midline low back and into his R buttock but it has dissipated since then  Objective: See treatment diary below    Assessment: Pt is nearly independent in aquatic exercise program application at this time  Pt able to complete aquatic exercise program in it entirety without significant discomfort or exacerbation of LBP  Tolerated treatment well  Patient demonstrated fatigue post treatment, exhibited good technique with therapeutic exercises and would benefit from continued PT    Plan: Continue per plan of care  Progress treatment as tolerated  Precautions: Patient's PMHx is remarkable for DM and HTN  Lumbar spine fusion, decompression and laminectomy on 19        Manual                                                                                   Exercise Diary        Water walking pre and post 5/5  5/5 5/5 5/5 5/5  5/5 5/5      Seated gastroc stretch:B:  NT            Seated hamstring stretch:B: 20sec 5x  20sec 5x  20 sec 5x 20 sec 5x 20sec 5x  [id number] 20 sec x5      Seated SKTC stretch:B: 10x 10sec  10x 10sec  10x 10 sec 10x 10 sec 10x 10sec 10x 10sec 10x 10sec      Seated DLS abdominal bracing/standing 3sec 20x  20x 3sec  20x 3 sec 20x 3 sec 20x 3sec  20x 3sec 20x 3sec      Seated LAQ:B: 20x  20x  20 20x 20x  20x 20x      Seated hip flexion:B: 20x  20x 20 20x 20x  20x 20x      Seated ankle arom:B:             Standing slr x 3:B: 30x  30x  30x 30x 30x  30x 30x      Standing HR and TR:B: 30x  30x 30x 30x 30x  30x 30x Standing hamstring curls:B: 30x  30x  30x 30x 30x  30x 30x      sls and tandem stance:B: NT  NT            Step ups:B: 20x  20x   20x 20x  20x 20x      Step downs:B: 20x  20x   20x 20x  20x 20x      Mini squats 30x  NT   30x 30x  30x 30x      Lunges:B:             Side stepping 8x  8x  8x 8x 8x  8x 8x      Tandem ambulation 6x  NT      5x      Side stepping with squats NT  NT            Pool pony hang 5 min  10 min   10 min 10 min  10min 10 min      Paddle rows and horizontal adduction rowsand shld ext x20 NT    Rows and shld ext x20 x20                                                 Modalities

## 2019-12-13 ENCOUNTER — OFFICE VISIT (OUTPATIENT)
Dept: PHYSICAL THERAPY | Age: 59
End: 2019-12-13
Payer: OTHER MISCELLANEOUS

## 2019-12-13 DIAGNOSIS — M54.16 LUMBAR RADICULOPATHY: Primary | ICD-10-CM

## 2019-12-13 DIAGNOSIS — M54.50 CHRONIC BILATERAL LOW BACK PAIN WITHOUT SCIATICA: ICD-10-CM

## 2019-12-13 DIAGNOSIS — G89.29 CHRONIC BILATERAL LOW BACK PAIN WITHOUT SCIATICA: ICD-10-CM

## 2019-12-13 PROCEDURE — 97113 AQUATIC THERAPY/EXERCISES: CPT

## 2019-12-13 NOTE — PROGRESS NOTES
Daily Note     Today's date: 2019  Patient name: Casey Chahal  : 1960  MRN: [id number]  Referring provider: Trell Mancuso MD  Dx:   Encounter Diagnosis     ICD-10-CM    1  Lumbar radiculopathy M54 16    2  Chronic bilateral low back pain without sciatica M54 5     G89 29                   Subjective: Pt noted 4/10 LB pain today after taking pain meds this am "I have had a hard time managing the pain since repeated bending at home, its very frustrating"     Objective: See treatment diary below    Assessment: Improved overall mobility, Pain remains the same  Plan: Continue per plan of care  Progress treatment as tolerated  Precautions: Patient's PMHx is remarkable for DM and HTN  Lumbar spine fusion, decompression and laminectomy on 19        Manual                                                                                   Exercise Diary       Water walking pre and post 5/5  5/5 5/5 5/5 5/5  5/5 5/5 5/5     Seated gastroc stretch:B:  NT            Seated hamstring stretch:B: 20sec 5x  20sec 5x  20 sec 5x 20 sec 5x 20sec 5x  [id number] 20 sec x5 20sec 5x      Seated SKTC stretch:B: 10x 10sec  10x 10sec  10x 10 sec 10x 10 sec 10x 10sec 10x 10sec 10x 10sec 10x 10sec      Seated DLS abdominal bracing/standing 3sec 20x  20x 3sec  20x 3 sec 20x 3 sec 20x 3sec  20x 3sec 20x 3sec 20x 3sec      Seated LAQ:B: 20x  20x  20 20x 20x  20x 20x 20x      Seated hip flexion:B: 20x  20x 20 20x 20x  20x 20x 20x      Seated ankle arom:B:             Standing slr x 3:B: 30x  30x  30x 30x 30x  30x 30x 30x      Standing HR and TR:B: 30x  30x 30x 30x 30x  30x 30x 30x      Standing hamstring curls:B: 30x  30x  30x 30x 30x  30x 30x 30x      sls and tandem stance:B: NT  NT            Step ups:B: 20x  20x   20x 20x  20x 20x 20x      Step downs:B: 20x  20x   20x 20x  20x 20x 20x      Mini squats 30x  NT   30x 30x  30x 30x 30x      Lunges:B: Side stepping 8x  8x  8x 8x 8x  8x 8x 8x      Tandem ambulation 6x  NT      5x 5x      Side stepping with squats NT  NT            Pool pony hang 5 min  10 min   10 min 10 min  10min 10 min 10 min      Paddle rows and horizontal adduction rowsand shld ext x20 NT    Rows and shld ext x20 x20 20x                                                 Modalities

## 2021-03-29 ENCOUNTER — IMMUNIZATIONS (OUTPATIENT)
Dept: FAMILY MEDICINE CLINIC | Facility: HOSPITAL | Age: 61
End: 2021-03-29

## 2021-03-29 DIAGNOSIS — Z23 ENCOUNTER FOR IMMUNIZATION: Primary | ICD-10-CM

## 2021-03-29 PROCEDURE — 91300 SARS-COV-2 / COVID-19 MRNA VACCINE (PFIZER-BIONTECH) 30 MCG: CPT

## 2021-03-29 PROCEDURE — 0001A SARS-COV-2 / COVID-19 MRNA VACCINE (PFIZER-BIONTECH) 30 MCG: CPT

## 2021-04-19 ENCOUNTER — IMMUNIZATIONS (OUTPATIENT)
Dept: FAMILY MEDICINE CLINIC | Facility: HOSPITAL | Age: 61
End: 2021-04-19

## 2021-04-19 DIAGNOSIS — Z23 ENCOUNTER FOR IMMUNIZATION: Primary | ICD-10-CM

## 2021-04-19 PROCEDURE — 91300 SARS-COV-2 / COVID-19 MRNA VACCINE (PFIZER-BIONTECH) 30 MCG: CPT

## 2021-04-19 PROCEDURE — 0002A SARS-COV-2 / COVID-19 MRNA VACCINE (PFIZER-BIONTECH) 30 MCG: CPT
